# Patient Record
Sex: MALE | Race: WHITE | Employment: UNEMPLOYED | URBAN - METROPOLITAN AREA
[De-identification: names, ages, dates, MRNs, and addresses within clinical notes are randomized per-mention and may not be internally consistent; named-entity substitution may affect disease eponyms.]

---

## 2021-05-28 ENCOUNTER — RECORDS - HEALTHEAST (OUTPATIENT)
Dept: ADMINISTRATIVE | Facility: CLINIC | Age: 46
End: 2021-05-28

## 2022-05-18 ENCOUNTER — ANCILLARY PROCEDURE (OUTPATIENT)
Dept: GENERAL RADIOLOGY | Facility: CLINIC | Age: 47
End: 2022-05-18
Attending: PEDIATRICS
Payer: COMMERCIAL

## 2022-05-18 ENCOUNTER — OFFICE VISIT (OUTPATIENT)
Dept: ORTHOPEDICS | Facility: CLINIC | Age: 47
End: 2022-05-18

## 2022-05-18 VITALS — WEIGHT: 315 LBS | HEIGHT: 72 IN | BODY MASS INDEX: 42.66 KG/M2

## 2022-05-18 DIAGNOSIS — S59.902A ELBOW INJURY, LEFT, INITIAL ENCOUNTER: Primary | ICD-10-CM

## 2022-05-18 DIAGNOSIS — S59.902A ELBOW INJURY, LEFT, INITIAL ENCOUNTER: ICD-10-CM

## 2022-05-18 DIAGNOSIS — S56.912A ELBOW STRAIN, LEFT, INITIAL ENCOUNTER: ICD-10-CM

## 2022-05-18 PROCEDURE — 73080 X-RAY EXAM OF ELBOW: CPT | Mod: TC | Performed by: RADIOLOGY

## 2022-05-18 PROCEDURE — 99203 OFFICE O/P NEW LOW 30 MIN: CPT | Performed by: PEDIATRICS

## 2022-05-18 RX ORDER — LOSARTAN POTASSIUM AND HYDROCHLOROTHIAZIDE 12.5; 1 MG/1; MG/1
TABLET ORAL
COMMUNITY
Start: 2022-05-06

## 2022-05-18 NOTE — PATIENT INSTRUCTIONS
This issue is consistent with a lateral elbow strain, at the extensor tendon (same site as would be noted with tennis elbow).  We discussed icing 10 to 20 minutes at a time, several times per day as able.  Over-the-counter medication as needed for soreness.  Discussed support options, including forearm strap.  He may use for comfort with activities if desired, not required.  An alternative is an elbow sleeve simply for compression.  Discussed rehab exercises; home exercises reviewed today, to start.  Formal hand therapy may be future consideration.  We discussed consideration of additional imaging of the affected area with MRI, but this is not required currently given assessment and plan for other intervention.  Plan monitoring over the next 2 weeks to start.  Contact clinic for any questions or concerns, patient if not improving during that time.    If you have any further questions for your physician or physician s care team you can call [phone] and use option 3 to leave a voice message. Calls received during business hours will be returned same day.

## 2022-05-18 NOTE — PROGRESS NOTES
ASSESSMENT & PLAN    Robinson was seen today for injury.    Diagnoses and all orders for this visit:    Elbow injury, left, initial encounter  -     XR Elbow Left G/E 3 Views; Future  -     Wrist/Arm/Hand Supplies Order for DME - ONLY FOR DME    Elbow strain, left, initial encounter  -     Wrist/Arm/Hand Supplies Order for DME - ONLY FOR DME      This issue is acute on chronic. Hx lateral epicondylitis, with acute strain.  Has used support for elbow in past, interested in that. Strap provided.  Hand therapy offered, home exercises reviewed.  Questions answered. Discussed signs and symptoms that may indicate more serious issues; the patient was instructed to seek appropriate care if noted. Robinson indicates understanding of these issues and agrees with the plan.        See Patient Instructions  Patient Instructions   This issue is consistent with a lateral elbow strain, at the extensor tendon (same site as would be noted with tennis elbow).  We discussed icing 10 to 20 minutes at a time, several times per day as able.  Over-the-counter medication as needed for soreness.  Discussed support options, including forearm strap.  He may use for comfort with activities if desired, not required.  An alternative is an elbow sleeve simply for compression.  Discussed rehab exercises; home exercises reviewed today, to start.  Formal hand therapy may be future consideration.  We discussed consideration of additional imaging of the affected area with MRI, but this is not required currently given assessment and plan for other intervention.  Plan monitoring over the next 2 weeks to start.  Contact clinic for any questions or concerns, patient if not improving during that time.    If you have any further questions for your physician or physician s care team you can call [phone] and use option 3 to leave a voice message. Calls received during business hours will be returned same day.        Jens Peña DO  [facility]  [place] [facility]    -----  Chief Complaint   Patient presents with     Left Elbow - Injury       SUBJECTIVE  Robinson Alberto is a/an 46 year old male who is seen as a self referral for evaluation of left elbow pain.  Pain began after trying to lift a table that was suck to the ground this morning.  Felt a pull over the lateral aspect of his elbow and has pain with the use of his wrist grasping and grabbing. .     The patient is seen by themselves.  The patient is Left handed    Onset: 1 hour(s) ago. Patient describes injury as lifting a table that was stuck to the ground   Location of Pain: left lateral elbow   Worsened by: use   Better with: compression   Treatments tried: compression   Associated symptoms: weakness of forearm     Orthopedic/Surgical history: previous similar injury, states he has had tendon injury multiple times in his elbow.    Social History/Occupation: does not work, lives in a group home.        **  Pain laterally. Feels similar to what he had noted previously with dx of tennis elbow.  No medial elbow pain.  No noted joint noise.    For previous tennis elbow, tried compression, band like device.    REVIEW OF SYSTEMS:  Review of Systems      OBJECTIVE:  Ht 1.829 m (6')   Wt (!) 163.3 kg (360 lb)   BMI 48.82 kg/m     General: alert and in no distress  HEENT: no scleral icterus or conjunctival erythema  Skin: no suspicious lesions or rash. No jaundice.  CV: distal perfusion intact   Resp: normal respiratory effort without conversational dyspnea   Psych: normal mood and affect  Gait: ambulates independently   Neuro: Normal light sensory exam of  extremity       Left Elbow exam:    Inspection:     no ecchymosis       no edema or effusion    ROM:     flexion full       extension full       forearm supination full       forearm pronation full    Strength:     Some pain with resisted long finger extension, wrist extension, forearm rotation    Tender:     lateral  epicondyle    Non-Tender: remainder, including medial epicondyle    Sensation: grossly intact light touch         RADIOLOGY:  I independently ordered, visualized and reviewed these images with the patient  Spur medial epicondyle. No acute bony abnormality noted.    Recent Results (from the past 24 hour(s))   XR Elbow Left G/E 3 Views    Narrative    LEFT ELBOW THREE OR MORE VIEWS 5/18/2022 9:20 AM     INDICATION: Left elbow injury and pain.     COMPARISON: None.      Impression    IMPRESSION:  1.  Normal joint spacing and alignment.  2.  No fracture or joint effusion.  3.  Tiny medial epicondyle enthesophyte.    RUIZ WORTHINGTON MD         SYSTEM ID:  [id number]

## 2022-05-18 NOTE — LETTER
5/18/2022         RE: Robinson Alberto  [address] [place] [id number]        Dear Colleague,    Thank you for referring your patient, Robinson Alberto, to the [facility]. Please see a copy of my visit note below.    ASSESSMENT & PLAN    Robinson was seen today for injury.    Diagnoses and all orders for this visit:    Elbow injury, left, initial encounter  -     XR Elbow Left G/E 3 Views; Future  -     Wrist/Arm/Hand Supplies Order for DME - ONLY FOR DME    Elbow strain, left, initial encounter  -     Wrist/Arm/Hand Supplies Order for DME - ONLY FOR DME      This issue is acute on chronic. Hx lateral epicondylitis, with acute strain.  Has used support for elbow in past, interested in that. Strap provided.  Hand therapy offered, home exercises reviewed.  Questions answered. Discussed signs and symptoms that may indicate more serious issues; the patient was instructed to seek appropriate care if noted. Robinson indicates understanding of these issues and agrees with the plan.        See Patient Instructions  Patient Instructions   This issue is consistent with a lateral elbow strain, at the extensor tendon (same site as would be noted with tennis elbow).  We discussed icing 10 to 20 minutes at a time, several times per day as able.  Over-the-counter medication as needed for soreness.  Discussed support options, including forearm strap.  He may use for comfort with activities if desired, not required.  An alternative is an elbow sleeve simply for compression.  Discussed rehab exercises; home exercises reviewed today, to start.  Formal hand therapy may be future consideration.  We discussed consideration of additional imaging of the affected area with MRI, but this is not required currently given assessment and plan for other intervention.  Plan monitoring over the next 2 weeks to start.  Contact clinic for any questions or concerns, patient if not improving during that  time.    If you have any further questions for your physician or physician s care team you can call [phone] and use option 3 to leave a voice message. Calls received during business hours will be returned same day.        Jens Peña DO  [facility]    -----  Chief Complaint   Patient presents with     Left Elbow - Injury       SUBJECTIVE  Robinson Alberto is a/an 46 year old male who is seen as a self referral for evaluation of left elbow pain.  Pain began after trying to lift a table that was suck to the ground this morning.  Felt a pull over the lateral aspect of his elbow and has pain with the use of his wrist grasping and grabbing. .     The patient is seen by themselves.  The patient is Left handed    Onset: 1 hour(s) ago. Patient describes injury as lifting a table that was stuck to the ground   Location of Pain: left lateral elbow   Worsened by: use   Better with: compression   Treatments tried: compression   Associated symptoms: weakness of forearm     Orthopedic/Surgical history: previous similar injury, states he has had tendon injury multiple times in his elbow.    Social History/Occupation: does not work, lives in a group home.        **  Pain laterally. Feels similar to what he had noted previously with dx of tennis elbow.  No medial elbow pain.  No noted joint noise.    For previous tennis elbow, tried compression, band like device.    REVIEW OF SYSTEMS:  Review of Systems      OBJECTIVE:  Ht 1.829 m (6')   Wt (!) 163.3 kg (360 lb)   BMI 48.82 kg/m     General: alert and in no distress  HEENT: no scleral icterus or conjunctival erythema  Skin: no suspicious lesions or rash. No jaundice.  CV: distal perfusion intact   Resp: normal respiratory effort without conversational dyspnea   Psych: normal mood and affect  Gait: ambulates independently   Neuro: Normal light sensory exam of  extremity       Left Elbow exam:    Inspection:     no ecchymosis        no edema or effusion    ROM:     flexion full       extension full       forearm supination full       forearm pronation full    Strength:     Some pain with resisted long finger extension, wrist extension, forearm rotation    Tender:     lateral epicondyle    Non-Tender: remainder, including medial epicondyle    Sensation: grossly intact light touch         RADIOLOGY:  I independently ordered, visualized and reviewed these images with the patient  Spur medial epicondyle. No acute bony abnormality noted.    Recent Results (from the past 24 hour(s))   XR Elbow Left G/E 3 Views    Narrative    LEFT ELBOW THREE OR MORE VIEWS 5/18/2022 9:20 AM     INDICATION: Left elbow injury and pain.     COMPARISON: None.      Impression    IMPRESSION:  1.  Normal joint spacing and alignment.  2.  No fracture or joint effusion.  3.  Tiny medial epicondyle enthesophyte.    RUIZ WORTHINGTON MD         SYSTEM ID:  [id number]               Again, thank you for allowing me to participate in the care of your patient.        Sincerely,        Jens Peña DO

## 2023-01-26 ENCOUNTER — OFFICE VISIT (OUTPATIENT)
Dept: URGENT CARE | Facility: URGENT CARE | Age: 48
End: 2023-01-26
Payer: COMMERCIAL

## 2023-01-26 VITALS
DIASTOLIC BLOOD PRESSURE: 80 MMHG | WEIGHT: 315 LBS | BODY MASS INDEX: 50.93 KG/M2 | OXYGEN SATURATION: 97 % | HEART RATE: 65 BPM | SYSTOLIC BLOOD PRESSURE: 150 MMHG | TEMPERATURE: 98.5 F

## 2023-01-26 DIAGNOSIS — E11.9 TYPE 2 DIABETES, HBA1C GOAL < 7% (H): ICD-10-CM

## 2023-01-26 DIAGNOSIS — R10.13 EPIGASTRIC PAIN: Primary | ICD-10-CM

## 2023-01-26 DIAGNOSIS — R61 DIAPHORESIS: ICD-10-CM

## 2023-01-26 PROCEDURE — 99215 OFFICE O/P EST HI 40 MIN: CPT | Performed by: FAMILY MEDICINE

## 2023-01-26 PROCEDURE — 93000 ELECTROCARDIOGRAM COMPLETE: CPT | Performed by: FAMILY MEDICINE

## 2023-01-26 NOTE — PATIENT INSTRUCTIONS
Abdominal pain off and on x 3 weeks worsening  Today moderate to severe accompanied by diaphoresis  Patient type 2 DM  EKG done here to my review showed normal sinus rhythm   Recommend [facility] for further evaluation and management of symptoms. Recommend cardiac rule out. Recommend abdominal pain evaluation.

## 2023-01-26 NOTE — PROGRESS NOTES
HPI:Robinson Alberto is a 47 year old male here today with complaints of abdominal pain    Been going on for 3 weeks off and on   Some timing with food   Progressively worsening  Today moderate to severe  Epigastric   aggravating symptoms: sometimes food   relieving symptoms: not sure  nausea and vomiting: nausea  diarrhea: none  fever or chills:  None    Problem list, Medication list, Allergies, and Medical/Social/Surgical histories reviewed in [facility] and updated as appropriate.      ROS:  Constitutional, HEENT, cardiovascular, pulmonary, GI, , musculoskeletal, neuro, skin, endocrine and psych systems are negative, except as otherwise noted.    OBJECTIVE:  BP (!) 150/80   Pulse 65   Temp 98.5  F (36.9  C) (Tympanic)   Wt (!) 170.3 kg (375 lb 8 oz)   SpO2 97%   BMI 50.93 kg/m     General:   awake, alert, and cooperative.  NAD.   Head: Normocephalic, atraumatic.  Eyes: Conjunctiva clear, non icteric. ISAIAS  Heart: Regular rate and rhythm. No murmur.  Lungs: Chest is clear; no wheezes or rales.  ABD: soft, mild epigastric  tenderness to palpation , no rigidity, guarding or rebound , bowel sounds intact  RECTAL: declined/deferred   Neuro: Alert and oriented - normal speech.       Diagnostic Test Results:  none   ASSESSMENT:well appearing    ICD-10-CM    1. Epigastric pain  R10.13 EKG 12-lead complete w/read - Clinics      2. Type 2 diabetes, HbA1c goal < 7% (H)  E11.9       3. Diaphoresis  R61           PLAN:     Abdominal pain off and on x 3 weeks worsening  Today moderate to severe accompanied by diaphoresis  Patient type 2 DM  EKG done here to my review showed normal sinus rhythm no acute ST Twave changes   Recommend [facility] for further evaluation and management of symptoms. Recommend cardiac rule out. Recommend abdominal pain evaluation.   Patient declined ambulance -risks discussed.   Will be transferred by group home   Report given to [facility] ER Triage       Thao Hanley MD

## 2023-04-10 DIAGNOSIS — R05.9 COUGH, UNSPECIFIED TYPE: Primary | ICD-10-CM

## 2023-04-10 RX ORDER — LEVALBUTEROL TARTRATE 45 UG/1
AEROSOL, METERED ORAL
Qty: 15 G | Refills: 0 | Status: SHIPPED | OUTPATIENT
Start: 2023-04-10

## 2023-04-10 NOTE — LETTER
April 10, 2023    Robinson Alberto  [address] [place] [id number]    Dear Robinson,       We recently received a refill request for levalbuteroL HFA 45 mcg/actuation aerosol inhaler .  We have refilled this for one time  only because you are due for a:    Medication check office visit      Please call at your earliest convenience so that there will not be a delay with your future refills.          Thank you,   Your [facility] Team/  [phone]

## 2023-04-10 NOTE — TELEPHONE ENCOUNTER
I gave patient one fill of medication. Please help them make an appointment as they are due for one before more refills.   Yessy Emanuel PA-C